# Patient Record
Sex: FEMALE | Race: BLACK OR AFRICAN AMERICAN | Employment: FULL TIME | URBAN - METROPOLITAN AREA
[De-identification: names, ages, dates, MRNs, and addresses within clinical notes are randomized per-mention and may not be internally consistent; named-entity substitution may affect disease eponyms.]

---

## 2023-10-13 PROBLEM — H61.21 EXCESSIVE CERUMEN IN RIGHT EAR CANAL: Status: ACTIVE | Noted: 2023-10-13

## 2023-10-13 PROBLEM — H69.93 DYSFUNCTION OF BOTH EUSTACHIAN TUBES: Status: ACTIVE | Noted: 2023-10-13

## 2023-10-13 PROBLEM — H91.90 DECREASED HEARING: Status: ACTIVE | Noted: 2023-10-13

## 2023-10-13 PROBLEM — N76.6 GENITAL LABIAL ULCER: Status: ACTIVE | Noted: 2023-10-13

## 2023-10-13 PROBLEM — H66.91 RIGHT OTITIS MEDIA: Status: ACTIVE | Noted: 2023-10-13

## 2023-10-13 PROBLEM — H90.11 CONDUCTIVE HEARING LOSS IN RIGHT EAR: Status: ACTIVE | Noted: 2023-10-13

## 2023-10-13 PROBLEM — N39.0 RECURRENT UTI (URINARY TRACT INFECTION): Status: ACTIVE | Noted: 2023-10-13

## 2023-10-13 PROBLEM — R59.0 CERVICAL ADENOPATHY: Status: ACTIVE | Noted: 2023-10-13

## 2023-10-13 PROBLEM — J30.89 ENVIRONMENTAL AND SEASONAL ALLERGIES: Status: ACTIVE | Noted: 2023-10-13

## 2023-10-13 PROBLEM — H92.01 OTALGIA, RIGHT: Status: ACTIVE | Noted: 2023-10-13

## 2023-10-13 PROBLEM — E07.89 THYROID FULLNESS: Status: ACTIVE | Noted: 2023-10-13

## 2023-10-13 PROBLEM — R59.1 LYMPHADENOPATHY: Status: ACTIVE | Noted: 2023-10-13

## 2023-10-13 PROBLEM — N89.8 VAGINAL DISCHARGE: Status: ACTIVE | Noted: 2023-10-13

## 2023-10-13 PROBLEM — N94.6 DYSMENORRHEA: Status: ACTIVE | Noted: 2023-10-13

## 2023-10-13 PROBLEM — B00.9 HSV-1 (HERPES SIMPLEX VIRUS 1) INFECTION: Status: ACTIVE | Noted: 2023-10-13

## 2023-10-13 RX ORDER — LEVONORGESTREL 52 MG/1
1 INTRAUTERINE DEVICE INTRAUTERINE ONCE
COMMUNITY

## 2023-10-13 RX ORDER — BUTYROSPERMUM PARKII(SHEA BUTTER), SIMMONDSIA CHINENSIS (JOJOBA) SEED OIL, ALOE BARBADENSIS LEAF EXTRACT .01; 1; 3.5 G/100G; G/100G; G/100G
LIQUID TOPICAL
COMMUNITY
Start: 2022-07-13 | End: 2023-10-16 | Stop reason: ALTCHOICE

## 2023-10-13 RX ORDER — NITROFURANTOIN (MACROCRYSTALS) 100 MG/1
CAPSULE ORAL
COMMUNITY
End: 2023-10-16 | Stop reason: ALTCHOICE

## 2023-10-13 RX ORDER — FLUTICASONE PROPIONATE 50 MCG
SPRAY, SUSPENSION (ML) NASAL
COMMUNITY
Start: 2021-08-03

## 2023-10-13 RX ORDER — CHOLECALCIFEROL (VITAMIN D3) 125 MCG
CAPSULE ORAL
COMMUNITY
Start: 2021-06-22 | End: 2023-10-16 | Stop reason: ALTCHOICE

## 2023-10-13 RX ORDER — LORATADINE 10 MG/1
10 TABLET ORAL DAILY
COMMUNITY
End: 2023-10-16 | Stop reason: ALTCHOICE

## 2023-10-13 RX ORDER — LORATADINE 10 MG/1
CAPSULE, LIQUID FILLED ORAL
COMMUNITY

## 2023-10-13 RX ORDER — NAPROXEN SODIUM 220 MG
220 TABLET ORAL AS NEEDED
COMMUNITY
End: 2023-10-16 | Stop reason: ALTCHOICE

## 2023-10-13 RX ORDER — NAPROXEN 500 MG/1
1 TABLET ORAL EVERY 12 HOURS PRN
COMMUNITY
Start: 2022-10-06

## 2023-10-13 RX ORDER — PHENAZOPYRIDINE HYDROCHLORIDE 200 MG/1
1 TABLET, FILM COATED ORAL 3 TIMES DAILY PRN
COMMUNITY
End: 2023-10-16 | Stop reason: ALTCHOICE

## 2023-10-16 ENCOUNTER — OFFICE VISIT (OUTPATIENT)
Dept: OTOLARYNGOLOGY | Facility: CLINIC | Age: 28
End: 2023-10-16
Payer: COMMERCIAL

## 2023-10-16 VITALS — WEIGHT: 142 LBS | BODY MASS INDEX: 26.81 KG/M2 | HEIGHT: 61 IN

## 2023-10-16 DIAGNOSIS — K21.9 LARYNGOPHARYNGEAL REFLUX (LPR): ICD-10-CM

## 2023-10-16 DIAGNOSIS — J34.3 HYPERTROPHY OF BOTH INFERIOR NASAL TURBINATES: ICD-10-CM

## 2023-10-16 DIAGNOSIS — R09.A2 GLOBUS PHARYNGEUS: ICD-10-CM

## 2023-10-16 DIAGNOSIS — J03.80 ACUTE TONSILLITIS DUE TO OTHER SPECIFIED ORGANISMS: Primary | ICD-10-CM

## 2023-10-16 PROCEDURE — 1036F TOBACCO NON-USER: CPT | Performed by: OTOLARYNGOLOGY

## 2023-10-16 PROCEDURE — 99213 OFFICE O/P EST LOW 20 MIN: CPT | Performed by: OTOLARYNGOLOGY

## 2023-10-16 RX ORDER — AZITHROMYCIN 500 MG/1
500 TABLET, FILM COATED ORAL DAILY
Qty: 5 TABLET | Refills: 0 | Status: SHIPPED | OUTPATIENT
Start: 2023-10-16 | End: 2023-10-21

## 2023-10-16 RX ORDER — FAMOTIDINE 20 MG/1
20 TABLET, FILM COATED ORAL 2 TIMES DAILY
Qty: 180 TABLET | Refills: 3 | Status: SHIPPED | OUTPATIENT
Start: 2023-10-16 | End: 2024-10-15

## 2023-10-16 NOTE — PROGRESS NOTES
"Chief Complaint   Patient presents with    New Patient Visit     NP- SORE THROAT/DIFFICULTY SWALLOWING      HPI:  Jeffery Pires is a 28 y.o. female she complains of over 2-week history of some throat discomfort.  Also some throat tightness globus and throat clearing.  No cough.  2 months ago had COVID but got better from that.  No shortness of breath    PMH:  Past Medical History:   Diagnosis Date    Acute cystitis with hematuria 06/11/2016    Hematuria due to acute cystitis    Dysuria 07/29/2016    Dysuria    Otitis media, unspecified, right ear 05/30/2021    Right otitis media    Personal history of other diseases of urinary system     History of bladder problems    Personal history of other drug therapy     COVID-19 vaccine series completed    Personal history of other drug therapy     History of varicella vaccination     Past Surgical History:   Procedure Laterality Date    OTHER SURGICAL HISTORY  11/13/2017    Oral Surgery         Medications:     Current Outpatient Medications:     levonorgestrel (Mirena) 21 mcg/24 hours (8 yrs) 52 mg IUD, 52 mg by intrauterine route 1 time., Disp: , Rfl:     loratadine (Claritin Liqui-Gel) 10 mg capsule, Take by mouth., Disp: , Rfl:     naproxen (EC Naprosyn) 500 mg EC tablet, Take 1 tablet (500 mg) by mouth every 12 hours if needed., Disp: , Rfl:     azithromycin (Zithromax) 500 mg tablet, Take 1 tablet (500 mg) by mouth once daily for 5 days., Disp: 5 tablet, Rfl: 0    famotidine (Pepcid) 20 mg tablet, Take 1 tablet (20 mg) by mouth 2 times a day., Disp: 180 tablet, Rfl: 3    fluticasone (Flonase Allergy Relief) 50 mcg/actuation nasal spray, Administer into affected nostril(s)., Disp: , Rfl:      Allergies:  Allergies   Allergen Reactions    Bee Pollen Unknown        ROS:  Review of systems normal unless stated otherwise in the HPI and/or PMH.    Physical Exam:  Height 1.549 m (5' 1\"), weight 64.4 kg (142 lb). Body mass index is 26.83 kg/m².     GENERAL APPEARANCE: Well " developed and well nourished.  Alert and oriented in no acute distress.  Normal vocal quality.      HEAD/FACE: No erythema or edema or facial tenderness.  Normal facial nerve function bilaterally.    EAR:       EXTERNAL: Normal pinnas and external auditory canals without lesion or obstructing wax.       MIDDLE EAR: Tympanic membranes intact and mobile with normal landmarks.  Middle ear space appears well aerated.       TUBE STATUS: N/A       MASTOID CAVITY: N/A       HEARING: Gross hearing assessment is within normal limits.      NOSE:       VISUALIZED USING: Anterior rhinoscopy with headlight and nasal speculum.       DORSUM: Midline, nontraumatic appearance.       MUCOSA: Normal-appearing.       SECRETIONS: Normal.       SEPTUM: Midline and nonobstructing.       INFERIOR TURBINATES: Bilateral hypertrophy       MIDDLE TURBINATES/MEATUS: N/A       BLEEDING: N/A         ORAL CAVITY/PHARYNX:       TEETH: Adequate dentition.       TONGUE: No mass or lesion.  Normal mobility.       FLOOR OF MOUTH: No mass or lesion.       PALATE: Normal hard palate, soft palate, and uvula.       OROPHARYNX: Normal without mass or lesion.  3+ tonsils with mild lacy exudate       BUCCAL MUCOSA/GBS: Normal without mass or lesion.       LIPS: Normal.    LARYNX/HYPOPHARYNX/NASOPHARYNX: N/A    NECK: No palpable masses or abnormal adenopathy.  Trachea is midline.    THYROID: No thyromegaly or palpable nodule.    SALIVARY GLANDS: Normal bilateral parotid and submandibular glands by inspection and palpation.    TMJ's: Normal.    NEURO: Cranial nerve exam grossly normal bilaterally.       Assessment/Plan   Jeffery was seen today for new patient visit.  Diagnoses and all orders for this visit:  Acute tonsillitis due to other specified organisms (Primary)  -     azithromycin (Zithromax) 500 mg tablet; Take 1 tablet (500 mg) by mouth once daily for 5 days.  Laryngopharyngeal reflux (LPR)  -     famotidine (Pepcid) 20 mg tablet; Take 1 tablet (20 mg)  by mouth 2 times a day.  Globus pharyngeus  -     famotidine (Pepcid) 20 mg tablet; Take 1 tablet (20 mg) by mouth 2 times a day.  Hypertrophy of both inferior nasal turbinates     Having some exudate on the tonsils with some symptoms.  Likely some low-grade infection.  We will treat with Zithromax.  I think her other symptoms may be related to some LPR.  We will try Pepcid.  Follow-up in 3 months or sooner with changes  Follow up in about 3 months (around 1/16/2024).     Jayson Hand MD

## 2024-01-04 ENCOUNTER — APPOINTMENT (OUTPATIENT)
Dept: OTOLARYNGOLOGY | Facility: CLINIC | Age: 29
End: 2024-01-04
Payer: COMMERCIAL

## 2024-01-15 ENCOUNTER — OFFICE VISIT (OUTPATIENT)
Dept: OTOLARYNGOLOGY | Facility: CLINIC | Age: 29
End: 2024-01-15
Payer: COMMERCIAL

## 2024-01-15 VITALS — BODY MASS INDEX: 26.4 KG/M2 | WEIGHT: 149 LBS | HEIGHT: 63 IN

## 2024-01-15 DIAGNOSIS — K21.9 LARYNGOPHARYNGEAL REFLUX (LPR): Primary | ICD-10-CM

## 2024-01-15 DIAGNOSIS — J34.3 HYPERTROPHY OF BOTH INFERIOR NASAL TURBINATES: ICD-10-CM

## 2024-01-15 DIAGNOSIS — R09.A2 GLOBUS PHARYNGEUS: ICD-10-CM

## 2024-01-15 PROCEDURE — 1036F TOBACCO NON-USER: CPT | Performed by: OTOLARYNGOLOGY

## 2024-01-15 PROCEDURE — 99214 OFFICE O/P EST MOD 30 MIN: CPT | Performed by: OTOLARYNGOLOGY

## 2024-01-15 NOTE — PROGRESS NOTES
"Chief Complaint   Patient presents with    Follow-up     LOV: 10/2023 F/U TONSILLITIS, REFLUX. GLOBUS     HPI:  Jeffery Pires is a 28 y.o. female who presents today in follow-up after I last saw her in October 2023 with throat symptoms consistent with laryngal pharyngeal reflux.  She has been taking Pepcid twice a day with great success.  She only notices problems sometimes at night if she forgets her medication or eats too much before she goes to bed.  No problems related to the medication.    PMH:  Past Medical History:   Diagnosis Date    Acute cystitis with hematuria 06/11/2016    Hematuria due to acute cystitis    Dysuria 07/29/2016    Dysuria    Otitis media, unspecified, right ear 05/30/2021    Right otitis media    Personal history of other diseases of urinary system     History of bladder problems    Personal history of other drug therapy     COVID-19 vaccine series completed    Personal history of other drug therapy     History of varicella vaccination     Past Surgical History:   Procedure Laterality Date    OTHER SURGICAL HISTORY  11/13/2017    Oral Surgery         Medications:     Current Outpatient Medications:     famotidine (Pepcid) 20 mg tablet, Take 1 tablet (20 mg) by mouth 2 times a day., Disp: 180 tablet, Rfl: 3    levonorgestrel (Mirena) 21 mcg/24 hours (8 yrs) 52 mg IUD, 52 mg by intrauterine route 1 time., Disp: , Rfl:     loratadine (Claritin Liqui-Gel) 10 mg capsule, Take by mouth., Disp: , Rfl:     naproxen (EC Naprosyn) 500 mg EC tablet, Take 1 tablet (500 mg) by mouth every 12 hours if needed., Disp: , Rfl:     fluticasone (Flonase Allergy Relief) 50 mcg/actuation nasal spray, Administer into affected nostril(s)., Disp: , Rfl:      Allergies:  Allergies   Allergen Reactions    Bee Pollen Unknown        ROS:  Review of systems normal unless stated otherwise in the HPI and/or PMH.    Physical Exam:  Height 1.6 m (5' 3\"), weight 67.6 kg (149 lb). Body mass index is 26.39 kg/m².     GENERAL " APPEARANCE: Well developed and well nourished.  Alert and oriented in no acute distress.  Normal vocal quality.      HEAD/FACE: No erythema or edema or facial tenderness.  Normal facial nerve function bilaterally.    EAR:       EXTERNAL: Normal pinnas and external auditory canals without lesion or obstructing wax.       MIDDLE EAR: Tympanic membranes intact and mobile with normal landmarks.  Middle ear space appears well aerated.       TUBE STATUS: N/A       MASTOID CAVITY: N/A       HEARING: Gross hearing assessment is within normal limits.      NOSE:       VISUALIZED USING: Anterior rhinoscopy with headlight and nasal speculum.       DORSUM: Midline, nontraumatic appearance.       MUCOSA: Normal-appearing.       SECRETIONS: Normal.       SEPTUM: Mild deviation       INFERIOR TURBINATES: Hypertrophy       MIDDLE TURBINATES/MEATUS: N/A       BLEEDING: N/A         ORAL CAVITY/PHARYNX:       TEETH: Adequate dentition.       TONGUE: No mass or lesion.  Normal mobility.       FLOOR OF MOUTH: No mass or lesion.       PALATE: Normal hard palate, soft palate, and uvula.       OROPHARYNX: Normal without mass or lesion.       BUCCAL MUCOSA/GBS: Normal without mass or lesion.       LIPS: Normal.    LARYNX/HYPOPHARYNX/NASOPHARYNX: N/A    NECK: No palpable masses or abnormal adenopathy.  Trachea is midline.    THYROID: No thyromegaly or palpable nodule.    SALIVARY GLANDS: Normal bilateral parotid and submandibular glands by inspection and palpation.    TMJ's: Normal.    NEURO: Cranial nerve exam grossly normal bilaterally.       Assessment/Plan   Jeffery was seen today for follow-up.  Diagnoses and all orders for this visit:  Laryngopharyngeal reflux (LPR) (Primary)  Globus pharyngeus  Hypertrophy of both inferior nasal turbinates     Doing quite well with current therapy for her laryngal pharyngeal reflux.  Provided education about dietary lifestyle modifications including trying not to eat within 3 hours of lying  down.  Continue famotidine therapy  Follow up if symptoms worsen or fail to improve.     Jayson Hand MD

## 2024-08-07 ENCOUNTER — OFFICE VISIT (OUTPATIENT)
Dept: OBSTETRICS AND GYNECOLOGY | Facility: CLINIC | Age: 29
End: 2024-08-07
Payer: COMMERCIAL

## 2024-08-07 VITALS — SYSTOLIC BLOOD PRESSURE: 114 MMHG | DIASTOLIC BLOOD PRESSURE: 62 MMHG | BODY MASS INDEX: 25.15 KG/M2 | WEIGHT: 142 LBS

## 2024-08-07 DIAGNOSIS — N76.0 BACTERIAL VAGINITIS: ICD-10-CM

## 2024-08-07 DIAGNOSIS — N89.8 VAGINAL DISCHARGE: ICD-10-CM

## 2024-08-07 DIAGNOSIS — B96.89 BACTERIAL VAGINITIS: ICD-10-CM

## 2024-08-07 DIAGNOSIS — R35.0 URINARY FREQUENCY: Primary | ICD-10-CM

## 2024-08-07 LAB
POC APPEARANCE, URINE: CLEAR
POC BILIRUBIN, URINE: NEGATIVE
POC BLOOD, URINE: NEGATIVE
POC COLOR, URINE: YELLOW
POC GLUCOSE, URINE: NEGATIVE MG/DL
POC KETONES, URINE: ABNORMAL MG/DL
POC LEUKOCYTES, URINE: ABNORMAL
POC NITRITE,URINE: NEGATIVE
POC PH, URINE: 5.5 PH
POC PROTEIN, URINE: ABNORMAL MG/DL
POC SPECIFIC GRAVITY, URINE: 1.02
POC UROBILINOGEN, URINE: 0.2 EU/DL

## 2024-08-07 PROCEDURE — 87205 SMEAR GRAM STAIN: CPT

## 2024-08-07 PROCEDURE — 81003 URINALYSIS AUTO W/O SCOPE: CPT | Performed by: OBSTETRICS & GYNECOLOGY

## 2024-08-07 PROCEDURE — 1036F TOBACCO NON-USER: CPT | Performed by: OBSTETRICS & GYNECOLOGY

## 2024-08-07 PROCEDURE — 99213 OFFICE O/P EST LOW 20 MIN: CPT | Performed by: OBSTETRICS & GYNECOLOGY

## 2024-08-07 PROCEDURE — 87086 URINE CULTURE/COLONY COUNT: CPT

## 2024-08-07 ASSESSMENT — ENCOUNTER SYMPTOMS
MUSCULOSKELETAL NEGATIVE: 1
CONSTITUTIONAL NEGATIVE: 1
GASTROINTESTINAL NEGATIVE: 1
RESPIRATORY NEGATIVE: 1
NEUROLOGICAL NEGATIVE: 1
FREQUENCY: 1
PSYCHIATRIC NEGATIVE: 1
CARDIOVASCULAR NEGATIVE: 1

## 2024-08-07 NOTE — PATIENT INSTRUCTIONS
Gynecologic visit for UTI symtoms:  You were seen in office today for urinary frequency and urgency  In women these symptoms may be a sign of urinary tract infection  A urine culture was sent today, you will be notified of results by phone call/MyChart  You can continue AZO, increased oral hydration, and cranberry juice/tablets as these can help  Monitor for signs of kidney infection: fever, chills, flu like symptoms, chest pain, shortness of breath, high back pain. Call the office for any of these symptoms  If symptoms continue please call the office or return for reassessment [phone] ([place]) or [phone] ([place])

## 2024-08-07 NOTE — PROGRESS NOTES
Subjective   Patient ID: Jeffery Pires is a 29 y.o. female who presents for possible infection.  HPI    Patient presents for new onset urinary frequency over the last few weeks. She states she will often need to void her bladder up to every 2 hours/5-6 times daily. She denies hematuria, urgency, dysuria. She denies incontinence. She notes increased vaginal discharge recently.     Review of Systems   Constitutional: Negative.    Respiratory: Negative.     Cardiovascular: Negative.    Gastrointestinal: Negative.    Genitourinary:  Positive for frequency.   Musculoskeletal: Negative.    Skin: Negative.    Neurological: Negative.    Psychiatric/Behavioral: Negative.         Objective   Visit Vitals  /62   Wt 64.4 kg (142 lb)   BMI 25.15 kg/m²   OB Status Implant   Smoking Status Never   BSA 1.69 m²       Physical Exam  Constitutional:       Appearance: Normal appearance.   Pulmonary:      Effort: Pulmonary effort is normal.   Genitourinary:     Comments: Vulva normal in appearance without discoloration, rashes, lesions. Vagina is negative for blood, discharge, lesions. Uterus is small, mobile, non tender. There is no cervical motion tenderness, adnexal masses/tenderness    Neurological:      Mental Status: She is alert.   Psychiatric:         Mood and Affect: Mood normal.         Behavior: Behavior normal.         Assessment/Plan   Problem List Items Addressed This Visit             ICD-10-CM    Vaginal discharge N89.8    Relevant Orders    Vaginitis Gram Stain For Bacterial Vaginosis + Yeast     Other Visit Diagnoses         Codes    Urinary frequency    -  Primary R35.0    Relevant Orders    POCT UA Automated manually resulted (Completed)    Urine Culture          Discussed findings on exam  Ucx and BV/yeast cultures sent  CBC, CMP, A1c ordered  Further management dependent on labs  Precautions given   Will call with results       Harriett Allen DO 08/07/24 2:41 PM

## 2024-08-08 LAB
BACTERIA UR CULT: NORMAL
CLUE CELLS VAG LPF-#/AREA: ABNORMAL /[LPF]
NUGENT SCORE: 8
YEAST VAG WET PREP-#/AREA: ABNORMAL

## 2024-08-09 DIAGNOSIS — B96.89 BACTERIAL VAGINITIS: ICD-10-CM

## 2024-08-09 DIAGNOSIS — N76.0 BACTERIAL VAGINITIS: ICD-10-CM

## 2024-08-09 RX ORDER — SECNIDAZOLE 2 G/4.8G
1 GRANULE ORAL ONCE
Qty: 1 EACH | Refills: 0 | Status: SHIPPED | OUTPATIENT
Start: 2024-08-09 | End: 2024-08-09

## 2024-09-04 ENCOUNTER — APPOINTMENT (OUTPATIENT)
Dept: OBSTETRICS AND GYNECOLOGY | Facility: CLINIC | Age: 29
End: 2024-09-04
Payer: COMMERCIAL

## 2024-09-04 VITALS
SYSTOLIC BLOOD PRESSURE: 118 MMHG | BODY MASS INDEX: 24.98 KG/M2 | HEIGHT: 63 IN | WEIGHT: 141 LBS | DIASTOLIC BLOOD PRESSURE: 64 MMHG

## 2024-09-04 DIAGNOSIS — N76.0 BV (BACTERIAL VAGINOSIS): ICD-10-CM

## 2024-09-04 DIAGNOSIS — N89.8 VAGINAL ITCHING: ICD-10-CM

## 2024-09-04 DIAGNOSIS — B96.89 BV (BACTERIAL VAGINOSIS): ICD-10-CM

## 2024-09-04 DIAGNOSIS — Z30.431 IUD CHECK UP: ICD-10-CM

## 2024-09-04 DIAGNOSIS — Z01.419 WELL WOMAN EXAM: Primary | ICD-10-CM

## 2024-09-04 DIAGNOSIS — Z12.4 CERVICAL CANCER SCREENING: ICD-10-CM

## 2024-09-04 PROCEDURE — 1036F TOBACCO NON-USER: CPT | Performed by: OBSTETRICS & GYNECOLOGY

## 2024-09-04 PROCEDURE — 88175 CYTOPATH C/V AUTO FLUID REDO: CPT

## 2024-09-04 PROCEDURE — 3008F BODY MASS INDEX DOCD: CPT | Performed by: OBSTETRICS & GYNECOLOGY

## 2024-09-04 PROCEDURE — 87205 SMEAR GRAM STAIN: CPT

## 2024-09-04 PROCEDURE — 99395 PREV VISIT EST AGE 18-39: CPT | Performed by: OBSTETRICS & GYNECOLOGY

## 2024-09-04 ASSESSMENT — ENCOUNTER SYMPTOMS
MUSCULOSKELETAL NEGATIVE: 1
RESPIRATORY NEGATIVE: 1
CONSTITUTIONAL NEGATIVE: 1
NEUROLOGICAL NEGATIVE: 1
GASTROINTESTINAL NEGATIVE: 1
CARDIOVASCULAR NEGATIVE: 1
PSYCHIATRIC NEGATIVE: 1

## 2024-09-04 NOTE — PATIENT INSTRUCTIONS
Routine Gynecologic Visit:  You were seen today for a routine gyn visit with normal findings on exam  You were due for a pap smear today. You should still continue to get annual breast and pelvic exams in the office.  Continue self breast exams/monitoring at home and call for appointment in the office if there are ever masses, skin discoloration, dimpling/puckering of the skin, or nipple drainage when you are not pregnant  We discussed contraception today and your IUD is in the correct position  If you are having any gynecologic issues in the next year you should call the office. [phone] ([place]) or [phone] ([place])

## 2024-09-04 NOTE — PROGRESS NOTES
"Rody   Euneata Pranay is a 29 y.o. female who is here for a routine exam. Patient is amenorrheic on Mirena IUD. No intermenstrual bleeding, spotting, or discharge. Denies dyspareunia. She notes recent vaginal/vulvar itching.     Patient's mother is entering hospice for glioblastoma.       Current contraception: IUD  History of abnormal Pap smear: no  Family history of uterine or ovarian cancer: no  Regular self breast exam: yes  History of abnormal mammogram: no  Family history of breast cancer: yes - maternal great grandmother  History of abnormal lipids: no  Menstrual History:  OB History          0    Para   0    Term   0       0    AB   0    Living   0         SAB   0    IAB   0    Ectopic   0    Multiple   0    Live Births   0                  No LMP recorded. Patient has had an implant.         Review of Systems   Constitutional: Negative.    Respiratory: Negative.     Cardiovascular: Negative.    Gastrointestinal: Negative.    Genitourinary: Negative.    Musculoskeletal: Negative.    Skin: Negative.    Neurological: Negative.    Psychiatric/Behavioral: Negative.         Objective   /64   Ht 1.594 m (5' 2.75\")   Wt 64 kg (141 lb)   BMI 25.18 kg/m²     General:   alert, appears stated age, and cooperative   Heart: regular rate and rhythm, S1, S2 normal, no murmur, click, rub or gallop   Lungs: clear to auscultation bilaterally   Abdomen: soft, non-tender, without masses or organomegaly   Pelvic: Vulva normal in appearance without discoloration, rashes, lesions. Vagina is negative for blood, discharge, lesions. Uterus is small, mobile, non tender. There is no cervical motion tenderness, adnexal masses/tenderness. IUD strings are visualized.     Breast: No masses, skin changes, discoloration, tenderness, or nipple drainage bilaterally     Lymph: No LAD   Neck: Normal ROM. Thyroid normal size. No masses/tenderness     Psych: Normal mood/affect     Assessment/Plan   Problem List Items " Addressed This Visit    None  Visit Diagnoses       Well woman exam    -  Primary    Cervical cancer screening        Relevant Orders    THINPREP PAP    IUD check up        Vaginal itching        Relevant Orders    Vaginitis Gram Stain For Bacterial Vaginosis + Yeast          1. Pelvic/breast exam wnl, counseled on breast awareness/self exam  2. Pap pending.  3. IUD in correct position without complication  RTO 1 year  Harriett Allen, DO

## 2024-09-05 LAB
CLUE CELLS VAG LPF-#/AREA: PRESENT /[LPF]
NUGENT SCORE: 9
YEAST VAG WET PREP-#/AREA: ABNORMAL

## 2024-09-05 RX ORDER — METRONIDAZOLE 500 MG/1
500 TABLET ORAL 2 TIMES DAILY
Qty: 10 TABLET | Refills: 0 | Status: SHIPPED | OUTPATIENT
Start: 2024-09-05 | End: 2024-09-10

## 2024-09-11 LAB
CYTOLOGY CMNT CVX/VAG CYTO-IMP: NORMAL
LAB AP CONTRACEPTIVE HISTORY: NORMAL
LAB AP HPV GENOTYPE QUESTION: YES
LAB AP HPV HR: NORMAL
LABORATORY COMMENT REPORT: NORMAL
PATH REPORT.TOTAL CANCER: NORMAL

## 2024-10-19 ENCOUNTER — HOSPITAL ENCOUNTER (OUTPATIENT)
Dept: RADIOLOGY | Facility: CLINIC | Age: 29
Discharge: HOME | End: 2024-10-19
Payer: COMMERCIAL

## 2024-10-19 ENCOUNTER — OFFICE VISIT (OUTPATIENT)
Dept: URGENT CARE | Age: 29
End: 2024-10-19
Payer: COMMERCIAL

## 2024-10-19 VITALS
OXYGEN SATURATION: 97 % | BODY MASS INDEX: 25 KG/M2 | WEIGHT: 140 LBS | RESPIRATION RATE: 14 BRPM | SYSTOLIC BLOOD PRESSURE: 110 MMHG | DIASTOLIC BLOOD PRESSURE: 81 MMHG | HEART RATE: 70 BPM

## 2024-10-19 DIAGNOSIS — S90.415A ABRASION OF LESSER TOE OF LEFT FOOT, INITIAL ENCOUNTER: ICD-10-CM

## 2024-10-19 DIAGNOSIS — S99.922A INJURY OF LEFT FOOT, INITIAL ENCOUNTER: Primary | ICD-10-CM

## 2024-10-19 DIAGNOSIS — S90.122A CONTUSION OF LESSER TOE OF LEFT FOOT WITHOUT DAMAGE TO NAIL, INITIAL ENCOUNTER: ICD-10-CM

## 2024-10-19 DIAGNOSIS — S99.922A INJURY OF LEFT FOOT, INITIAL ENCOUNTER: ICD-10-CM

## 2024-10-19 PROCEDURE — 73630 X-RAY EXAM OF FOOT: CPT | Mod: LEFT SIDE | Performed by: RADIOLOGY

## 2024-10-19 PROCEDURE — 73630 X-RAY EXAM OF FOOT: CPT | Mod: LT

## 2024-10-19 ASSESSMENT — PAIN SCALES - GENERAL: PAINLEVEL_OUTOF10: 7

## 2024-10-19 NOTE — PROGRESS NOTES
HPI:  Patient states that she accidentally bumped her left foot on a table at home earlier today.  Pt c/o pain and swelling in her left 4th-5th toes.  +pain with weight bearing.  No numbness/weakness.          ROS:  No numbness/weakness  +pain with weight bearing    PE:    A&O x3  NCAT  Normal respiratory effort  +swelling/bruising/tndop over left 4th toe, +tndop over left 5th toe  +pain with left 4th-5th toes movement  Superficial abrasion on the left 5th toe with no active bleeding  No focal deficit  Judgement normal  No neurovascular deficit over left 4th-5th toes    Results:  Xray left foot:initial read no fx    A/P:   Left foot contusion  4th-5th Toes contusion  Superficial abrasion on the 5th toe    We will call you with xray results if you need further treatment.  Ice.  Elevate.  Rest.  Tape toes together.  Use Bacitracin on abrasion and monitor for any infection.  Tylenol/Motrin as needed for pain.  Follow up with orthopedist for further evaluation in 2 weeks if symptoms persist.  Keep a diary of symptoms.  Go to the ER if starts getting worse.

## 2024-10-28 ENCOUNTER — APPOINTMENT (OUTPATIENT)
Dept: OBSTETRICS AND GYNECOLOGY | Facility: CLINIC | Age: 29
End: 2024-10-28
Payer: COMMERCIAL

## 2024-10-28 ENCOUNTER — TELEPHONE (OUTPATIENT)
Dept: OBSTETRICS AND GYNECOLOGY | Facility: CLINIC | Age: 29
End: 2024-10-28

## 2024-12-10 ENCOUNTER — TELEPHONE (OUTPATIENT)
Dept: OBSTETRICS AND GYNECOLOGY | Facility: CLINIC | Age: 29
End: 2024-12-10
Payer: COMMERCIAL

## 2024-12-18 ENCOUNTER — APPOINTMENT (OUTPATIENT)
Dept: OBSTETRICS AND GYNECOLOGY | Facility: CLINIC | Age: 29
End: 2024-12-18
Payer: COMMERCIAL

## 2024-12-18 VITALS — SYSTOLIC BLOOD PRESSURE: 108 MMHG | BODY MASS INDEX: 25 KG/M2 | WEIGHT: 140 LBS | DIASTOLIC BLOOD PRESSURE: 68 MMHG

## 2024-12-18 DIAGNOSIS — N76.0 ACUTE VAGINITIS: Primary | ICD-10-CM

## 2024-12-18 DIAGNOSIS — N76.0 RECURRENT VAGINITIS: ICD-10-CM

## 2024-12-18 PROCEDURE — 87205 SMEAR GRAM STAIN: CPT

## 2024-12-18 PROCEDURE — 99214 OFFICE O/P EST MOD 30 MIN: CPT | Performed by: OBSTETRICS & GYNECOLOGY

## 2024-12-18 RX ORDER — METRONIDAZOLE 500 MG/1
500 TABLET ORAL DAILY PRN
Qty: 10 TABLET | Refills: 0 | Status: SHIPPED | OUTPATIENT
Start: 2024-12-18

## 2024-12-18 RX ORDER — METRONIDAZOLE 500 MG/1
500 TABLET ORAL 2 TIMES DAILY
Qty: 10 TABLET | Refills: 0 | Status: SHIPPED | OUTPATIENT
Start: 2024-12-18 | End: 2024-12-23

## 2024-12-18 RX ORDER — ATOMOXETINE 18 MG/1
1 CAPSULE ORAL
COMMUNITY
Start: 2024-11-19

## 2024-12-18 NOTE — PROGRESS NOTES
Rody Pires is a 29 y.o. female who complains of vaginal discharge/itch.    HPI:  Symptoms reported: vaginal discharge and foul vaginal odor  Onset:  3 weeks  Course: persistent  Progression: stayed the same    Helpful treatments: none  Unhelpful treatments tried: none    Objective   Physical Exam:   General Appearance: alert and oriented, in no acute distress  Abdomen: soft, non-tender; bowel sounds normal; no masses, no organomegaly  Pelvic Exam: external genitalia normal, uterus normal size, shape, and consistency, no cervical motion tenderness, cervix normal in appearance, no adnexal masses or tenderness, rectovaginal septum normal, and thin white discharge  Urine dipstick: not done.    Assessment/Plan   Diagnoses and all orders for this visit:  Acute vaginitis  -     Vaginitis Gram Stain For Bacterial Vaginosis + Yeast    Discussed findings on exam  BV/yeast culture sent  Rx for Flagyl sent to pharmacy  Will start prophylactic Flagyl x 1 dose and Rephresh immediately after intercourse  Precautions given  RTO PRN  Harriett Allen DO

## 2024-12-18 NOTE — PATIENT INSTRUCTIONS
Vaginal itching, burning, and/or discharge in the Premenopausal Patient:  You were seen in office today for vaginal discharge, itching, burning, and odor.   In premenopausal women these symptoms may be a sign of vaginitis or STD infection, change in vaginal pH, contact irritant/allergic reaction  A BV and yeast culture were sent today, you will be notified of results by phone call/MyChart  You can use Aquaphor or A&D ointment for comfort until we get the results of your cultures. Rephresh vaginal gel is a vaginal acidifying agent that can help with pH and can be bought over the counter in most pharmacies. If you have frequent vaginitis symptoms oral probiotics that contain Lactobacillus can sometimes help: Rephresh Pro-B, Honey Pot, Uqora    Continue to abstain from soap/douching products in the vagina, and to use loose fitting cotton underwear  If symptoms continue please call the office or return for reassessment [phone] ([place]) or [phone] ([place])

## 2024-12-19 LAB
CLUE CELLS VAG LPF-#/AREA: PRESENT /[LPF]
NUGENT SCORE: 7
YEAST VAG WET PREP-#/AREA: ABNORMAL

## 2025-08-04 ENCOUNTER — APPOINTMENT (OUTPATIENT)
Dept: OBSTETRICS AND GYNECOLOGY | Facility: CLINIC | Age: 30
End: 2025-08-04
Payer: COMMERCIAL

## 2025-08-04 VITALS — BODY MASS INDEX: 24.64 KG/M2 | DIASTOLIC BLOOD PRESSURE: 68 MMHG | WEIGHT: 138 LBS | SYSTOLIC BLOOD PRESSURE: 110 MMHG

## 2025-08-04 DIAGNOSIS — N76.0 ACUTE VAGINITIS: Primary | ICD-10-CM

## 2025-08-04 DIAGNOSIS — N76.0 RECURRENT VAGINITIS: ICD-10-CM

## 2025-08-04 PROCEDURE — 99214 OFFICE O/P EST MOD 30 MIN: CPT | Performed by: OBSTETRICS & GYNECOLOGY

## 2025-08-04 PROCEDURE — 1036F TOBACCO NON-USER: CPT | Performed by: OBSTETRICS & GYNECOLOGY

## 2025-08-04 RX ORDER — METRONIDAZOLE 500 MG/1
500 TABLET ORAL DAILY PRN
Qty: 10 TABLET | Refills: 0 | Status: SHIPPED | OUTPATIENT
Start: 2025-08-04

## 2025-08-04 RX ORDER — ATOMOXETINE 40 MG/1
1 CAPSULE ORAL
COMMUNITY
Start: 2025-07-03

## 2025-08-04 RX ORDER — TRAZODONE HYDROCHLORIDE 50 MG/1
TABLET ORAL
COMMUNITY
Start: 2025-03-13

## 2025-08-04 ASSESSMENT — ENCOUNTER SYMPTOMS
HEADACHES: 0
ANOREXIA: 0
CHILLS: 0
NAUSEA: 0
HEMATURIA: 0
DYSURIA: 1
BACK PAIN: 0
VOMITING: 0
FEVER: 0
SORE THROAT: 0
FLANK PAIN: 0
ABDOMINAL PAIN: 1
CONSTIPATION: 0
DIARRHEA: 0
FREQUENCY: 1

## 2025-08-04 NOTE — PROGRESS NOTES
Rody Pires is a 30 y.o. female who complains of vaginal discharge/itch.    HPI:  Symptoms reported: vaginal discharge, foul vaginal odor, and burning  Onset: 2-3 weeks  Course: persistent  Progression: stayed the same    Helpful treatments: none  Unhelpful treatments tried: none    Objective   Physical Exam:   General Appearance: alert and oriented, in no acute distress  Abdomen: soft, non-tender; bowel sounds normal; no masses, no organomegaly  Pelvic Exam: external genitalia normal, uterus normal size, shape, and consistency, no cervical motion tenderness, cervix normal in appearance, no adnexal masses or tenderness, rectovaginal septum normal, and thin/watery discharge  Urine dipstick: not done.    Assessment/Plan   Diagnoses and all orders for this visit:  Acute vaginitis  -     Vaginitis Gram Stain For Bacterial Vaginosis + Yeast  Recurrent vaginitis    Discussed findings on exam and clinical course  Patient was doing well on postcoital prophylactic Flagyl but ran out, will do a therapeutic dosing Flagyl to treat current infection then start prophylactic postcoital Flagyl  BV/yeast culture sent today  Will call with results    Harriett Allen, DO

## 2025-08-04 NOTE — PATIENT INSTRUCTIONS
Vaginal itching, burning, and/or discharge in the Premenopausal Patient:  You were seen in office today for vaginal discharge, itching, burning, and odor.   In premenopausal women these symptoms may be a sign of vaginitis or STD infection, change in vaginal pH, contact irritant/allergic reaction  A BV and yeast culture was sent today, you will be notified of results by phone call/MyChart  You can use Aquaphor or A&D ointment for comfort until we get the results of your cultures. Rephresh vaginal gel is a vaginal acidifying agent that can help with pH and can be bought over the counter in most pharmacies. If you have frequent vaginitis symptoms oral probiotics that contain Lactobacillus can sometimes help: Rephresh Pro-B, Honey Pot, Uqora    Continue to abstain from soap/douching products in the vagina, and to use loose fitting cotton underwear  If symptoms continue please call the office or return for reassessment [phone] ([place]) or [phone] ([place])

## 2025-08-06 LAB — BV SCORE VAG QL: ABNORMAL

## 2025-08-14 ENCOUNTER — APPOINTMENT (OUTPATIENT)
Dept: OBSTETRICS AND GYNECOLOGY | Facility: CLINIC | Age: 30
End: 2025-08-14
Payer: COMMERCIAL

## 2025-08-14 VITALS
SYSTOLIC BLOOD PRESSURE: 100 MMHG | HEIGHT: 62 IN | BODY MASS INDEX: 25.4 KG/M2 | DIASTOLIC BLOOD PRESSURE: 60 MMHG | WEIGHT: 138 LBS

## 2025-08-14 DIAGNOSIS — Z30.431 IUD CHECK UP: ICD-10-CM

## 2025-08-14 DIAGNOSIS — Z12.4 CERVICAL CANCER SCREENING: ICD-10-CM

## 2025-08-14 DIAGNOSIS — Z01.419 WELL WOMAN EXAM: Primary | ICD-10-CM

## 2025-08-14 PROCEDURE — 99395 PREV VISIT EST AGE 18-39: CPT | Performed by: OBSTETRICS & GYNECOLOGY

## 2025-08-14 PROCEDURE — 1036F TOBACCO NON-USER: CPT | Performed by: OBSTETRICS & GYNECOLOGY

## 2025-08-14 PROCEDURE — 3008F BODY MASS INDEX DOCD: CPT | Performed by: OBSTETRICS & GYNECOLOGY

## 2025-08-14 ASSESSMENT — ENCOUNTER SYMPTOMS
MUSCULOSKELETAL NEGATIVE: 1
CONSTITUTIONAL NEGATIVE: 1
GASTROINTESTINAL NEGATIVE: 1
CARDIOVASCULAR NEGATIVE: 1
NEUROLOGICAL NEGATIVE: 1
PSYCHIATRIC NEGATIVE: 1
RESPIRATORY NEGATIVE: 1